# Patient Record
Sex: MALE | URBAN - METROPOLITAN AREA
[De-identification: names, ages, dates, MRNs, and addresses within clinical notes are randomized per-mention and may not be internally consistent; named-entity substitution may affect disease eponyms.]

---

## 2018-07-11 ENCOUNTER — EMERGENCY (EMERGENCY)
Facility: HOSPITAL | Age: 58
LOS: 1 days | Discharge: HOME | End: 2018-07-11
Admitting: EMERGENCY MEDICINE
Payer: MEDICAID

## 2018-07-11 VITALS
WEIGHT: 186.95 LBS | DIASTOLIC BLOOD PRESSURE: 79 MMHG | HEART RATE: 72 BPM | RESPIRATION RATE: 18 BRPM | SYSTOLIC BLOOD PRESSURE: 149 MMHG | OXYGEN SATURATION: 98 % | TEMPERATURE: 98 F

## 2018-07-11 DIAGNOSIS — I10 ESSENTIAL (PRIMARY) HYPERTENSION: ICD-10-CM

## 2018-07-11 DIAGNOSIS — S91.101A UNSPECIFIED OPEN WOUND OF RIGHT GREAT TOE WITHOUT DAMAGE TO NAIL, INITIAL ENCOUNTER: ICD-10-CM
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
## 2018-07-11 DIAGNOSIS — Z79.2 LONG TERM (CURRENT) USE OF ANTIBIOTICS: ICD-10-CM

## 2018-07-11 DIAGNOSIS — E11.65 TYPE 2 DIABETES MELLITUS WITH HYPERGLYCEMIA: ICD-10-CM
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
## 2018-07-11 DIAGNOSIS — M79.671 PAIN IN RIGHT FOOT: ICD-10-CM

## 2018-07-11 PROCEDURE — 99284 EMERGENCY DEPT VISIT MOD MDM: CPT | Mod: 25

## 2018-07-12 VITALS
OXYGEN SATURATION: 98 % | TEMPERATURE: 98 F | DIASTOLIC BLOOD PRESSURE: 70 MMHG | SYSTOLIC BLOOD PRESSURE: 148 MMHG | RESPIRATION RATE: 18 BRPM | HEART RATE: 68 BPM

## 2018-07-12 PROCEDURE — 73630 X-RAY EXAM OF FOOT: CPT

## 2018-07-12 PROCEDURE — 82962 GLUCOSE BLOOD TEST: CPT

## 2018-07-12 PROCEDURE — 87040 BLOOD CULTURE FOR BACTERIA: CPT

## 2018-07-12 PROCEDURE — 73630 X-RAY EXAM OF FOOT: CPT | Mod: 26,RT

## 2018-07-12 PROCEDURE — 96372 THER/PROPH/DIAG INJ SC/IM: CPT

## 2018-07-12 PROCEDURE — 85025 COMPLETE CBC W/AUTO DIFF WBC: CPT

## 2018-07-12 PROCEDURE — 99285 EMERGENCY DEPT VISIT HI MDM: CPT | Mod: 25

## 2018-07-12 PROCEDURE — 36415 COLL VENOUS BLD VENIPUNCTURE: CPT

## 2018-07-12 PROCEDURE — 80053 COMPREHEN METABOLIC PANEL: CPT

## 2018-07-12 RX ORDER — INSULIN GLARGINE 100 [IU]/ML
10 INJECTION, SOLUTION SUBCUTANEOUS ONCE
Qty: 0 | Refills: 0 | Status: COMPLETED | OUTPATIENT
Start: 2018-07-12 | End: 2018-07-12

## 2018-07-12 RX ORDER — INSULIN HUMAN 100 [IU]/ML
4 INJECTION, SOLUTION SUBCUTANEOUS ONCE
Qty: 0 | Refills: 0 | Status: COMPLETED | OUTPATIENT
Start: 2018-07-12 | End: 2018-07-12

## 2018-07-12 RX ORDER — METFORMIN HYDROCHLORIDE 850 MG/1
500 TABLET ORAL ONCE
Qty: 0 | Refills: 0 | Status: COMPLETED | OUTPATIENT
Start: 2018-07-12 | End: 2018-07-12

## 2018-07-12 RX ORDER — KETOROLAC TROMETHAMINE 30 MG/ML
60 SYRINGE (ML) INJECTION ONCE
Qty: 0 | Refills: 0 | Status: DISCONTINUED | OUTPATIENT
Start: 2018-07-12 | End: 2018-07-12

## 2018-07-12 RX ADMIN — METFORMIN HYDROCHLORIDE 500 MILLIGRAM(S): 850 TABLET ORAL at 01:56

## 2018-07-12 RX ADMIN — Medication 60 MILLIGRAM(S): at 01:12

## 2018-07-12 RX ADMIN — Medication 1 TABLET(S): at 01:56

## 2018-07-12 RX ADMIN — INSULIN GLARGINE 10 UNIT(S): 100 INJECTION, SOLUTION SUBCUTANEOUS at 01:56

## 2018-07-12 RX ADMIN — INSULIN HUMAN 4 UNIT(S): 100 INJECTION, SOLUTION SUBCUTANEOUS at 01:56

## 2018-07-12 NOTE — ED ADULT NURSE NOTE - OBJECTIVE STATEMENT
56 y/o male with hx of DM, IV drug abuse, bipolar disorder arrived to [facility] ER in police custody and is hand-cuffed from left wrist to chair reporting right foot pain for the past month. Pt verbalized that he hasn't been taking his medications. Pt verbalized that he was seen at [facility] recently and was told he has an infection in his foot and he suppose to get IV antibiotics but he left AMA for family emergency. Upon assessment, scabs noted to upper extremities, swelling and open wound noted to right big toe with no active drainage, abdomen soft, lung fields WNL, breathing is equal and unlabored, pulses palpable. Pt denies chest pain, headache, dizziness, blurred vision, slurred speech, nausea, vomiting, diarrhea, fever, chills, LOC, SOB, weakness, fatigue, recent travel, recent injury, and palpitations. Care in progress

## 2018-07-12 NOTE — ED PROVIDER NOTE - CHPI ED SYMPTOMS NEG
no drainage/no redness/no fever/no chills/no bleeding/no bleeding at site/no vomiting/no purulent drainage

## 2018-07-12 NOTE — ED PROVIDER NOTE - SKIN WOUND TYPE
right foot 1st toe at base + open wound, No swelling, erythema, purulent drainage, no cellulitis or foul odor.

## 2018-07-12 NOTE — ED PROVIDER NOTE - MEDICAL DECISION MAKING DETAILS
Patient with right foot toe wound afebrile. Patient with right foot toe wound, afebrile, ambulatory. Neg xrays and labs shows hyperglycemia which was corrected.  PO abx was given and rx was given to be filled out while incarcerated. Mild elevated K+ with no cardiac symptoms and will correct with insulin given. Pt was released to police custody.

## 2021-04-12 ENCOUNTER — OUTPATIENT (OUTPATIENT)
Dept: OUTPATIENT SERVICES | Facility: HOSPITAL | Age: 61
LOS: 1 days | Discharge: HOME | End: 2021-04-12

## 2021-04-12 PROBLEM — E11.9 TYPE 2 DIABETES MELLITUS WITHOUT COMPLICATIONS: Chronic | Status: ACTIVE | Noted: 2018-07-12

## 2021-04-12 PROBLEM — F31.9 BIPOLAR DISORDER, UNSPECIFIED: Chronic | Status: ACTIVE | Noted: 2018-07-12

## 2021-04-13 DIAGNOSIS — F11.20 OPIOID DEPENDENCE, UNCOMPLICATED: ICD-10-CM

## 2022-07-22 NOTE — ED ADULT TRIAGE NOTE - AS TEMP SITE
Immunization History   Administered Date(s) Administered    COVID-19, MODERNA BLUE border, Primary or Immunocompromised, (age 12y+), IM, 100 mcg/0.5mL 01/07/2021, 02/05/2021    Influenza A ([id number]) Vaccine PF IM 10/29/2009    Influenza Virus Vaccine 10/15/2017, 09/24/2019, 10/12/2020    Tdap (Boostrix, Adacel) 08/31/2013 oral
